# Patient Record
Sex: MALE | Race: WHITE | NOT HISPANIC OR LATINO | Employment: FULL TIME | URBAN - METROPOLITAN AREA
[De-identification: names, ages, dates, MRNs, and addresses within clinical notes are randomized per-mention and may not be internally consistent; named-entity substitution may affect disease eponyms.]

---

## 2023-11-12 PROBLEM — R20.8 DYSESTHESIA: Status: ACTIVE | Noted: 2023-11-12

## 2023-11-12 PROBLEM — J34.89 NASAL OBSTRUCTION: Status: ACTIVE | Noted: 2023-11-12

## 2023-11-12 PROBLEM — K21.9 LARYNGOPHARYNGEAL REFLUX: Status: ACTIVE | Noted: 2023-11-12

## 2023-11-12 PROBLEM — R09.82 POST-NASAL DISCHARGE: Status: ACTIVE | Noted: 2023-11-12

## 2023-11-12 PROBLEM — R43.8 IMPAIRED SENSE OF SMELL: Status: ACTIVE | Noted: 2023-11-12

## 2023-11-12 PROBLEM — H61.22 EXCESSIVE CERUMEN IN EAR CANAL, LEFT: Status: ACTIVE | Noted: 2023-11-12

## 2023-11-12 PROBLEM — I48.0 PAROXYSMAL ATRIAL FIBRILLATION (MULTI): Status: ACTIVE | Noted: 2018-05-18

## 2023-11-12 PROBLEM — I10 ESSENTIAL HYPERTENSION: Status: ACTIVE | Noted: 2018-08-15

## 2023-11-12 PROBLEM — H90.3 BILATERAL SENSORINEURAL HEARING LOSS: Status: ACTIVE | Noted: 2023-11-12

## 2023-11-12 PROBLEM — R09.89 THROAT CLEARING: Status: ACTIVE | Noted: 2023-11-12

## 2023-11-12 PROBLEM — J34.3 HYPERTROPHY OF NASAL TURBINATES: Status: ACTIVE | Noted: 2023-11-12

## 2023-11-12 PROBLEM — Z86.79 HISTORY OF ATRIAL FIBRILLATION: Status: ACTIVE | Noted: 2023-11-12

## 2023-11-12 PROBLEM — R09.81 NASAL CONGESTION: Status: ACTIVE | Noted: 2023-11-12

## 2023-11-12 PROBLEM — H69.93 DYSFUNCTION OF BOTH EUSTACHIAN TUBES: Status: ACTIVE | Noted: 2023-11-12

## 2023-11-12 PROBLEM — Z86.16 HISTORY OF SEVERE ACUTE RESPIRATORY SYNDROME CORONAVIRUS 2 (SARS-COV-2) DISEASE: Status: ACTIVE | Noted: 2023-11-12

## 2023-11-12 PROBLEM — J34.2 DEVIATED NASAL SEPTUM: Status: ACTIVE | Noted: 2023-11-12

## 2023-11-12 PROBLEM — J32.9 CHRONIC RHINOSINUSITIS: Status: ACTIVE | Noted: 2023-11-12

## 2023-11-12 PROBLEM — Z86.79 HISTORY OF HYPERTENSION: Status: ACTIVE | Noted: 2023-11-12

## 2023-11-12 RX ORDER — MELATONIN 1 MG
1 TABLET,CHEWABLE ORAL NIGHTLY
COMMUNITY

## 2023-11-12 RX ORDER — PHENOL 1.4 %
1 AEROSOL, SPRAY (ML) MUCOUS MEMBRANE DAILY
COMMUNITY
Start: 2018-05-09

## 2023-11-12 RX ORDER — MOMETASONE FUROATE 100 %
POWDER (GRAM) MISCELLANEOUS
COMMUNITY
Start: 2023-04-03

## 2023-11-12 RX ORDER — AMOXICILLIN AND CLAVULANATE POTASSIUM 875; 125 MG/1; MG/1
875 TABLET, FILM COATED ORAL 2 TIMES DAILY
COMMUNITY
Start: 2018-05-08 | End: 2023-11-15 | Stop reason: ALTCHOICE

## 2023-11-12 RX ORDER — METOPROLOL SUCCINATE 25 MG/1
25 TABLET, EXTENDED RELEASE ORAL DAILY
COMMUNITY
Start: 2018-04-27

## 2023-11-12 RX ORDER — ASPIRIN 325 MG
325 TABLET ORAL DAILY
COMMUNITY
Start: 2018-05-09

## 2023-11-12 RX ORDER — IPRATROPIUM BROMIDE 42 UG/1
2 SPRAY, METERED NASAL 4 TIMES DAILY
COMMUNITY
Start: 2023-08-07

## 2023-11-12 RX ORDER — AZELASTINE 1 MG/ML
1 SPRAY, METERED NASAL 2 TIMES DAILY
COMMUNITY
Start: 2021-11-01

## 2023-11-12 RX ORDER — CETIRIZINE HYDROCHLORIDE 10 MG/1
10 TABLET ORAL
COMMUNITY
Start: 2018-05-09

## 2023-11-12 RX ORDER — LISINOPRIL 10 MG/1
10 TABLET ORAL DAILY
COMMUNITY
Start: 2018-04-30

## 2023-11-12 RX ORDER — PANTOPRAZOLE SODIUM 40 MG/1
40 TABLET, DELAYED RELEASE ORAL
COMMUNITY
Start: 2021-12-13

## 2023-11-12 RX ORDER — LOSARTAN POTASSIUM 50 MG/1
50 TABLET ORAL DAILY
COMMUNITY
Start: 2021-10-12

## 2023-11-12 RX ORDER — MONTELUKAST SODIUM 10 MG/1
10 TABLET ORAL NIGHTLY
COMMUNITY
Start: 2018-04-19

## 2023-11-12 RX ORDER — CHLORPHENIRAMINE MALEATE 4 MG
4 TABLET ORAL EVERY 6 HOURS PRN
COMMUNITY
Start: 2022-11-15

## 2023-11-12 RX ORDER — SOD CHLOR,BICARB/SQUEEZ BOTTLE
PACKET, WITH RINSE DEVICE NASAL
COMMUNITY
Start: 2023-02-13

## 2023-11-15 ENCOUNTER — OFFICE VISIT (OUTPATIENT)
Dept: OTOLARYNGOLOGY | Facility: CLINIC | Age: 68
End: 2023-11-15
Payer: MEDICARE

## 2023-11-15 VITALS
TEMPERATURE: 97.4 F | DIASTOLIC BLOOD PRESSURE: 80 MMHG | HEIGHT: 71 IN | RESPIRATION RATE: 16 BRPM | WEIGHT: 198 LBS | HEART RATE: 64 BPM | BODY MASS INDEX: 27.72 KG/M2 | SYSTOLIC BLOOD PRESSURE: 129 MMHG

## 2023-11-15 DIAGNOSIS — J33.9 NASAL POLYPS: ICD-10-CM

## 2023-11-15 DIAGNOSIS — K21.9 LARYNGOPHARYNGEAL REFLUX: ICD-10-CM

## 2023-11-15 DIAGNOSIS — R09.81 NASAL CONGESTION: ICD-10-CM

## 2023-11-15 DIAGNOSIS — J34.89 NASAL DRAINAGE: ICD-10-CM

## 2023-11-15 DIAGNOSIS — J34.2 DEVIATED NASAL SEPTUM: ICD-10-CM

## 2023-11-15 DIAGNOSIS — J34.3 HYPERTROPHY OF BOTH INFERIOR NASAL TURBINATES: ICD-10-CM

## 2023-11-15 DIAGNOSIS — J34.89 NASAL OBSTRUCTION: ICD-10-CM

## 2023-11-15 DIAGNOSIS — J32.9 CHRONIC RHINOSINUSITIS: Primary | ICD-10-CM

## 2023-11-15 PROCEDURE — 1159F MED LIST DOCD IN RCRD: CPT | Performed by: STUDENT IN AN ORGANIZED HEALTH CARE EDUCATION/TRAINING PROGRAM

## 2023-11-15 PROCEDURE — 1036F TOBACCO NON-USER: CPT | Performed by: STUDENT IN AN ORGANIZED HEALTH CARE EDUCATION/TRAINING PROGRAM

## 2023-11-15 PROCEDURE — 3074F SYST BP LT 130 MM HG: CPT | Performed by: STUDENT IN AN ORGANIZED HEALTH CARE EDUCATION/TRAINING PROGRAM

## 2023-11-15 PROCEDURE — 3079F DIAST BP 80-89 MM HG: CPT | Performed by: STUDENT IN AN ORGANIZED HEALTH CARE EDUCATION/TRAINING PROGRAM

## 2023-11-15 PROCEDURE — 99214 OFFICE O/P EST MOD 30 MIN: CPT | Performed by: STUDENT IN AN ORGANIZED HEALTH CARE EDUCATION/TRAINING PROGRAM

## 2023-11-15 RX ORDER — OMEPRAZOLE 20 MG/1
20 CAPSULE, DELAYED RELEASE ORAL
COMMUNITY
Start: 2023-01-09

## 2023-11-15 ASSESSMENT — ENCOUNTER SYMPTOMS
DEPRESSION: 0
OCCASIONAL FEELINGS OF UNSTEADINESS: 0
LOSS OF SENSATION IN FEET: 0

## 2023-11-15 NOTE — PROGRESS NOTES
Assessment  Chronic rhinosinusitis with nasal polyps  Facial pressure  Nasal drainage  Nasal airway obstruction  Nasal septal deviation  Bilateral inferior turbinate hypertrophy  History of sinonasal surgery  History of C-diff      Plan  - CRS  The patient and I discussed their current nasal / sinus symptoms as well as several therapeutic options.  Reports his sinonasal symptomatology is well controlled. We will continue his current  nasal hygiene/ medical regimen consisting of saline/mometasone rinses (every other day).   Endorses improvement of his posterior/anterior nasal drainage with ipratropium nasal spray (PRN).        - LPR  No significant improvement of his posterior nasal drainage with pantoprazole trial.  Denies heartburn/reflux symptomatology.  He will stop pantoprazole and resume it as needed.     RTC 6m      History of Present Illness  11/15/23  Sinonasal symptomatology is well controlled.  Nasal drainage improved with ipratropium nasal spray as needed.  8/7/23  The patient present for follow-up, reports his sinonasal symptomatology is currently well controlled.  Does endorse increased nasal drainage triggered by hot beverages and going from cold to hot environments like a hot shower.  Also endorses thick pharyngeal mucus/throat clearing in the mornings for years, has a history of reflux.   4/3/23  The patient presents for follow-up, reports significant improvement of his sinonasal symptomatology including nasal congestion/obstruction and nasal drainage. Facial pressure has resolved.  Current therapy: Saline/mometasone rinses qd; Azelastine PRN.      Recall   67-year-old male presenting for initial evaluation of multiple sinonasal complaints     Daily sinonasal symptoms include bilateral nasal congestion/obstruction, anterior posterior nasal drainage, facial pressure on the maxillary and frontal regions bilaterally.  Duration: Years, worse for the past 6 months  Laterality: Bilateral  In addition the  patient endorses decreased smell and possible environmental allergies.  Has received multiple courses of antibiotics without significant improvement of his symptoms.   Developed C. difficile following prolonged course of azithromycin (~3w ago).  Endorses brief course of steroids (3d) with slight symptomatic improvement.  Patient denies facial pain, immunotherapy.   No odynophagia/dysphagia/SOB/dyspnea/hoarseness/fevers/chills/weight loss/night sweats.     Current treatment:  Nasal Steroid: Flonase   Sinus Rinse: No  Antihistamine: No  Prednisone: No  Other: None     Recent CT: CT maxillofacial 12/19/22 notable for postsurgical changes s/p EES, bilateral maxillary, ethmoid, frontal and sphenoid edema.  Previous nasal/sinus surgery: FESS 1990s.       Past Medical History:   Diagnosis Date    Personal history of other diseases of the circulatory system     History of atrial fibrillation    Personal history of other diseases of the circulatory system     History of hypertension    Personal history of peptic ulcer disease     History of gastric ulcer       Past Surgical History:   Procedure Laterality Date    SINUS SURGERY  05/09/2018    Sinus Surgery         Current Outpatient Medications on File Prior to Visit   Medication Sig Dispense Refill    omeprazole (PriLOSEC) 20 mg DR capsule Take 1 capsule (20 mg) by mouth once daily in the morning. Take before meals.      amoxicillin-pot clavulanate (Augmentin) 875-125 mg tablet Take 1 tablet (875 mg) by mouth 2 times a day.      aspirin 325 mg tablet Take 1 tablet (325 mg) by mouth once daily.      azelastine (Astelin) 137 mcg (0.1 %) nasal spray Administer 1 spray into affected nostril(s) twice a day.      cetirizine (ZyrTEC) 10 mg tablet Take 1 tablet (10 mg) by mouth once daily.      chlorpheniramine (Chlor-Trimeton) 4 mg tablet Take 1 tablet (4 mg) by mouth every 6 hours if needed for allergies.      doxylamine (Unisom) 25 mg tablet Take 1 tablet (25 mg) by mouth once  daily at bedtime.      ipratropium (Atrovent) 42 mcg (0.06 %) nasal spray Administer 2 sprays into affected nostril(s) 4 times a day.      lisinopril 10 mg tablet Take 1 tablet (10 mg) by mouth once daily.      losartan (Cozaar) 50 mg tablet Take 1 tablet (50 mg) by mouth once daily.      melatonin 1 mg tablet,chewable Chew 1 tablet once daily at bedtime.      metoprolol succinate XL (Toprol-XL) 25 mg 24 hr tablet Take 1 tablet (25 mg) by mouth once daily.      mometasone furoate, bulk, 100 % powder Mometasone Furoate Powder Quantity: 0 Refills: 0 Ordered: 3-Apr-2023 DO Start : 3-Apr-2023 Active      montelukast (Singulair) 10 mg tablet Take 1 tablet (10 mg) by mouth once daily at bedtime.      multivitamin with minerals iron-free (Adults 50 Plus) Take 1 tablet by mouth once daily.      pantoprazole (ProtoNix) 40 mg EC tablet Take 1 tablet (40 mg) by mouth once daily in the morning. Take before meals.      sod chloride-sodium bicarb (Sinus Rinse Starter) nasal rinse Administer into affected nostril(s).       No current facility-administered medications on file prior to visit.        Review of Systems  A detailed 12 point ROS was performed and is negative except as noted in the intake form, HPI and/or Past Medical History     There were no vitals filed for this visit.     Physical Exam  CONSTITUTIONAL: Vitals -reviewed from intake field, well developed, well nourished.  VOICE: Normal voice quality  RESPIRATION: Breathing comfortably, no stridor.  CV: No clubbing/cyanosis/edema in hands.  EYES: EOM Intact, sclera normal.  NEURO: Alert and oriented times 3, Cranial nerves V,VII intact and symmetric bilaterally.  HEAD AND FACE: Symmetric facial features, no masses or lesions, sinuses nontender to palpation.  SALIVARY GLANDS: Parotid and submandibular glands normal bilaterally.  EARS: Normal external ears, external auditory canals, and TMs to otoscopy  + NOSE: External nose midline, anterior rhinoscopy is normal with  limited visualization to the anterior aspect of the interior turbinates. No lesions noted.  Bilateral inferior turbinate hypertrophy  Right posterior septal deviation  ORAL CAVITY/OROPHARYNX/LIPS: Normal mucous membranes, normal floor of mouth/tongue/OP, no masses or lesions are noted.  PHARYNGEAL WALLS AND NASOPHARYNX: No masses noted. Mucosa appears clean and moist  NECK/LYMPH: No LAD, no thyroid masses. Trachea palpably midline  SKIN: Neck skin is without scar or injury  PSYCH: Alert and oriented with appropriate mood and affect

## 2024-01-30 RX ORDER — MOMETASONE FUROATE 100 %
POWDER (GRAM) MISCELLANEOUS
OUTPATIENT
Start: 2024-01-30

## 2024-01-30 NOTE — TELEPHONE ENCOUNTER
Requested Prescriptions     Pending Prescriptions Disp Refills    mometasone furoate, bulk, 100 % powder       Sig: Mometasone Furoate Powder Quantity: 0 Refills: 0 Ordered: 3-Apr-2023 DO Start : 3-Apr-2023 Active     Юлия Plummer MA     General

## 2024-05-15 ENCOUNTER — OFFICE VISIT (OUTPATIENT)
Dept: OTOLARYNGOLOGY | Facility: CLINIC | Age: 69
End: 2024-05-15
Payer: MEDICARE

## 2024-05-15 VITALS — BODY MASS INDEX: 27.58 KG/M2 | WEIGHT: 197 LBS | HEIGHT: 71 IN

## 2024-05-15 DIAGNOSIS — J34.2 DEVIATED NASAL SEPTUM: ICD-10-CM

## 2024-05-15 DIAGNOSIS — J32.9 CHRONIC RHINOSINUSITIS: Primary | ICD-10-CM

## 2024-05-15 DIAGNOSIS — J33.9 NASAL POLYPS: ICD-10-CM

## 2024-05-15 DIAGNOSIS — H61.21 IMPACTED CERUMEN OF RIGHT EAR: ICD-10-CM

## 2024-05-15 DIAGNOSIS — J34.89 NASAL OBSTRUCTION: ICD-10-CM

## 2024-05-15 DIAGNOSIS — R09.81 NASAL CONGESTION: ICD-10-CM

## 2024-05-15 DIAGNOSIS — J34.3 HYPERTROPHY OF BOTH INFERIOR NASAL TURBINATES: ICD-10-CM

## 2024-05-15 DIAGNOSIS — J34.89 NASAL DRAINAGE: ICD-10-CM

## 2024-05-15 PROCEDURE — 1159F MED LIST DOCD IN RCRD: CPT | Performed by: STUDENT IN AN ORGANIZED HEALTH CARE EDUCATION/TRAINING PROGRAM

## 2024-05-15 PROCEDURE — 69210 REMOVE IMPACTED EAR WAX UNI: CPT | Performed by: STUDENT IN AN ORGANIZED HEALTH CARE EDUCATION/TRAINING PROGRAM

## 2024-05-15 PROCEDURE — 99213 OFFICE O/P EST LOW 20 MIN: CPT | Performed by: STUDENT IN AN ORGANIZED HEALTH CARE EDUCATION/TRAINING PROGRAM

## 2024-05-15 PROCEDURE — 31231 NASAL ENDOSCOPY DX: CPT | Performed by: STUDENT IN AN ORGANIZED HEALTH CARE EDUCATION/TRAINING PROGRAM

## 2024-05-15 PROCEDURE — 1036F TOBACCO NON-USER: CPT | Performed by: STUDENT IN AN ORGANIZED HEALTH CARE EDUCATION/TRAINING PROGRAM

## 2024-05-15 NOTE — PROGRESS NOTES
Assessment  Chronic rhinosinusitis with nasal polyps  Facial pressure  Nasal drainage  Nasal airway obstruction  Nasal septal deviation  Bilateral inferior turbinate hypertrophy  History of sinonasal surgery  History of C-diff      Plan  - CRS  The patient and I discussed their current nasal / sinus symptoms as well as several therapeutic options.  Sinonasal symptomatology is well controlled.   He will continue his current nasal hygiene/ medical regimen consisting of saline/mometasone rinses (every other day).   Endorses improvement of his posterior/anterior nasal drainage with ipratropium nasal spray (PRN).   Right cerumen impaction removed, hearing at baseline following cerumen removal.  RTC 1y      History of Present Illness  5/15/24  The patient presents for follow-up, reports his sinonasal symptomatology is well-controlled, endorses occasional bilateral anterior and posterior nasal drainage otherwise no sinonasal complaints.  11/15/23  Sinonasal symptomatology is well controlled.  Nasal drainage improved with ipratropium nasal spray as needed.  8/7/23  The patient present for follow-up, reports his sinonasal symptomatology is currently well controlled.  Does endorse increased nasal drainage triggered by hot beverages and going from cold to hot environments like a hot shower.  Also endorses thick pharyngeal mucus/throat clearing in the mornings for years, has a history of reflux.   4/3/23  The patient presents for follow-up, reports significant improvement of his sinonasal symptomatology including nasal congestion/obstruction and nasal drainage. Facial pressure has resolved.  Current therapy: Saline/mometasone rinses qd; Azelastine PRN.   Recall   67-year-old male presenting for initial evaluation of multiple sinonasal complaints  Daily sinonasal symptoms include bilateral nasal congestion/obstruction, anterior posterior nasal drainage, facial pressure on the maxillary and frontal regions bilaterally.  Duration:  Years, worse for the past 6 months  Laterality: Bilateral  In addition the patient endorses decreased smell and possible environmental allergies.  Has received multiple courses of antibiotics without significant improvement of his symptoms.   Developed C. difficile following prolonged course of azithromycin (~3w ago).  Endorses brief course of steroids (3d) with slight symptomatic improvement.  Patient denies facial pain, immunotherapy.   No odynophagia/dysphagia/SOB/dyspnea/hoarseness/fevers/chills/weight loss/night sweats.     Current treatment:  Nasal Steroid: Flonase   Sinus Rinse: No  Antihistamine: No  Prednisone: No  Other: None     Recent CT: CT maxillofacial 12/19/22 notable for postsurgical changes s/p EES, bilateral maxillary, ethmoid, frontal and sphenoid edema.  Previous nasal/sinus surgery: FESS 1990s.       Past Medical History:   Diagnosis Date    Personal history of other diseases of the circulatory system     History of atrial fibrillation    Personal history of other diseases of the circulatory system     History of hypertension    Personal history of peptic ulcer disease     History of gastric ulcer       Past Surgical History:   Procedure Laterality Date    SINUS SURGERY  05/09/2018    Sinus Surgery         Current Outpatient Medications on File Prior to Visit   Medication Sig Dispense Refill    aspirin 325 mg tablet Take 1 tablet (325 mg) by mouth once daily.      azelastine (Astelin) 137 mcg (0.1 %) nasal spray Administer 1 spray into affected nostril(s) twice a day.      cetirizine (ZyrTEC) 10 mg tablet Take 1 tablet (10 mg) by mouth once daily.      chlorpheniramine (Chlor-Trimeton) 4 mg tablet Take 1 tablet (4 mg) by mouth every 6 hours if needed for allergies.      doxylamine (Unisom) 25 mg tablet Take 1 tablet (25 mg) by mouth once daily at bedtime.      ipratropium (Atrovent) 42 mcg (0.06 %) nasal spray Administer 2 sprays into affected nostril(s) 4 times a day.      lisinopril 10 mg  tablet Take 1 tablet (10 mg) by mouth once daily.      losartan (Cozaar) 50 mg tablet Take 1 tablet (50 mg) by mouth once daily.      melatonin 1 mg tablet,chewable Chew 1 tablet once daily at bedtime.      metoprolol succinate XL (Toprol-XL) 25 mg 24 hr tablet Take 1 tablet (25 mg) by mouth once daily.      mometasone furoate, bulk, 100 % powder Mometasone Furoate Powder Quantity: 0 Refills: 0 Ordered: 3-Apr-2023 DO Start : 3-Apr-2023 Active      montelukast (Singulair) 10 mg tablet Take 1 tablet (10 mg) by mouth once daily at bedtime.      multivitamin with minerals iron-free (Adults 50 Plus) Take 1 tablet by mouth once daily.      sod chloride-sodium bicarb (Sinus Rinse Starter) nasal rinse Administer into affected nostril(s).      omeprazole (PriLOSEC) 20 mg DR capsule Take 1 capsule (20 mg) by mouth once daily in the morning. Take before meals.      pantoprazole (ProtoNix) 40 mg EC tablet Take 1 tablet (40 mg) by mouth once daily in the morning. Take before meals.       No current facility-administered medications on file prior to visit.        Review of Systems  A detailed 12 point ROS was performed and is negative except as noted in the intake form, HPI and/or Past Medical History     There were no vitals filed for this visit.     Physical Exam  CONSTITUTIONAL: Well developed, well nourished.  VOICE: Normal voice quality  RESPIRATION: Breathing comfortably, no stridor.  CV: No clubbing/cyanosis/edema in hands.  EYES: EOM Intact, sclera normal.  NEURO: Alert and oriented times 3, Cranial nerves V,VII intact and symmetric bilaterally.  HEAD AND FACE: Symmetric facial features, no masses or lesions, sinuses nontender to palpation.  SALIVARY GLANDS: Parotid and submandibular glands normal bilaterally.  + EARS: Normal external ears  Right EAC with cerumen obstruction (removed)  Right EAC patent, tympanic membrane intact  Left EAC patent, tympanic membrane intact   + NOSE: External nose midline, anterior rhinoscopy  is normal with limited visualization to the anterior aspect of the interior turbinates. No lesions noted.  Bilateral inferior turbinate hypertrophy  Right posterior septal deviation  ORAL CAVITY/OROPHARYNX/LIPS: Normal mucous membranes, normal floor of mouth/tongue/OP, no masses or lesions are noted.  PHARYNGEAL WALLS AND NASOPHARYNX: No masses noted. Mucosa appears clean and moist  NECK/LYMPH: No LAD, no thyroid masses. Trachea palpably midline  SKIN: Neck skin is without scar or injury  PSYCH: Alert and oriented with appropriate mood and affect     Nasal endoscopy:  PROCEDURE NOTE     For better visualization because of septal deviation, turbinate hypertrophy nasal endoscopy was performed after verbal consent was obtained by the patient and/or guardian. Both nostrils were sprayed with a mixture of lidocaine 4% and Afrin. After a sufficient amount of time elapsed for mucosal anesthesia to take place, the nasal endoscope was advanced into the nostril.     The following areas were visualized:  Nasal passage, nasal septum, turbinates, middle meatus, nasopharynx, sinus ostia     The patient tolerated the procedure well and these structures were found to be normal except as follows:  Bilateral inferior turbinate hypertrophy  Right septal deviation  Right: Minimal generalized mucosal edema.   Mild polyp changes medial/lateral to middle turbinate   Surgical changes/scarring along the left middle meatus and ethmoid region. No mucopurulence noted.  Left: Minimal mucosal edema, polyps noted within the middle meatus significant improved.  Surgical changes/scarring along the middle meatus and ethmoid region. No mucopurulence noted  No masses seen in inferior meati, middle meati, nor sphenoethmoidal recesses bilaterally.     Procedure: Removal of impacted cerumen, right   Indication: Cerumen impaction.   The procedure was reviewed and explained.  Verbal consent was obtained.  With the use of the otoscope the right ear was  examined, cerumen was cleaned with the use of curettes.  Hearing at baseline following cerumen removal.  The patient tolerated the procedure well.

## 2025-02-25 ENCOUNTER — HOSPITAL ENCOUNTER (OUTPATIENT)
Dept: RADIOLOGY | Facility: CLINIC | Age: 70
Discharge: HOME | End: 2025-02-25
Payer: MEDICARE

## 2025-02-25 DIAGNOSIS — Z13.6 ENCOUNTER FOR SCREENING FOR CARDIOVASCULAR DISORDERS: ICD-10-CM

## 2025-02-25 PROCEDURE — 75571 CT HRT W/O DYE W/CA TEST: CPT

## 2025-05-20 ENCOUNTER — APPOINTMENT (OUTPATIENT)
Dept: OTOLARYNGOLOGY | Facility: CLINIC | Age: 70
End: 2025-05-20
Payer: MEDICARE

## 2025-05-20 DIAGNOSIS — J34.89 NASAL DRAINAGE: ICD-10-CM

## 2025-05-20 DIAGNOSIS — J34.89 NASAL OBSTRUCTION: ICD-10-CM

## 2025-05-20 DIAGNOSIS — J32.9 CHRONIC RHINOSINUSITIS: ICD-10-CM

## 2025-05-20 DIAGNOSIS — J34.2 DEVIATED NASAL SEPTUM: ICD-10-CM

## 2025-05-20 DIAGNOSIS — K21.9 LARYNGOPHARYNGEAL REFLUX (LPR): Primary | ICD-10-CM

## 2025-05-20 DIAGNOSIS — R09.89 THROAT CLEARING: ICD-10-CM

## 2025-05-20 DIAGNOSIS — R09.81 NASAL CONGESTION: ICD-10-CM

## 2025-05-20 DIAGNOSIS — J33.9 NASAL POLYPS: ICD-10-CM

## 2025-05-20 DIAGNOSIS — J34.3 HYPERTROPHY OF BOTH INFERIOR NASAL TURBINATES: ICD-10-CM

## 2025-05-20 RX ORDER — PANTOPRAZOLE SODIUM 40 MG/1
40 TABLET, DELAYED RELEASE ORAL
Qty: 90 TABLET | Refills: 1 | Status: SHIPPED | OUTPATIENT
Start: 2025-05-20 | End: 2025-11-16

## 2025-05-20 NOTE — PROGRESS NOTES
Assessment  Chronic rhinosinusitis with nasal polyps  Facial pressure  Nasal drainage  Nasal airway obstruction  Nasal septal deviation  Bilateral inferior turbinate hypertrophy  History of sinonasal surgery  History of C-diff      Plan  - CRS  The patient and I discussed their current nasal / sinus symptoms as well as several therapeutic options.  Sinonasal symptomatology is controlled.   He will continue his current nasal hygiene/ medical regimen consisting of saline/mometasone rinses (every other day).   Stopped ipratropium nasal spray to use due to significant nasal dryness.    - LPR   Reports frequent throat clearing more noticeable in the mornings.  FNP notable for LPR changes which could be contributing to his symptomatology, dietary modifications for reflux control in addition to OTC Gaviscon use discussed.  Pantoprazole trial prescribed    RTC 2m        History of Present Illness  5/20/25  Endorses thick pharyngeal mucus, throat clearing more noticeable in the mornings.  Throat clearing has been present for years.  Denies sinonasal complaints.  Denies odynophagia, dysphagia, fevers, chills, night sweats, neck pain, neck lumps or bumps, weight loss, voice changes.  Stopped ipratropium nasal spray due to nasal dryness.  Reports  11/15/23  Sinonasal symptomatology is well controlled.  Nasal drainage improved with ipratropium nasal spray as needed.  8/7/23  The patient present for follow-up, reports his sinonasal symptomatology is currently well controlled.  Does endorse increased nasal drainage triggered by hot beverages and going from cold to hot environments like a hot shower.  Also endorses thick pharyngeal mucus/throat clearing in the mornings for years, has a history of reflux.   4/3/23  The patient presents for follow-up, reports significant improvement of his sinonasal symptomatology including nasal congestion/obstruction and nasal drainage. Facial pressure has resolved.  Current therapy:  Saline/mometasone rinses qd; Azelastine PRN.   Recall   67-year-old male presenting for initial evaluation of multiple sinonasal complaints  Daily sinonasal symptoms include bilateral nasal congestion/obstruction, anterior posterior nasal drainage, facial pressure on the maxillary and frontal regions bilaterally.  Duration: Years, worse for the past 6 months  Laterality: Bilateral  In addition the patient endorses decreased smell and possible environmental allergies.  Has received multiple courses of antibiotics without significant improvement of his symptoms.   Developed C. difficile following prolonged course of azithromycin (~3w ago).  Endorses brief course of steroids (3d) with slight symptomatic improvement.  Patient denies facial pain, immunotherapy.   No odynophagia/dysphagia/SOB/dyspnea/hoarseness/fevers/chills/weight loss/night sweats.     Current treatment:  Nasal Steroid: Flonase   Sinus Rinse: No  Antihistamine: No  Prednisone: No  Other: None     Recent CT: CT maxillofacial 12/19/22 notable for postsurgical changes s/p EES, bilateral maxillary, ethmoid, frontal and sphenoid edema.  Previous nasal/sinus surgery: FESS 1990s.       Past Medical History:   Diagnosis Date    Personal history of other diseases of the circulatory system     History of atrial fibrillation    Personal history of other diseases of the circulatory system     History of hypertension    Personal history of peptic ulcer disease     History of gastric ulcer       Past Surgical History:   Procedure Laterality Date    SINUS SURGERY  05/09/2018    Sinus Surgery         Current Outpatient Medications on File Prior to Visit   Medication Sig Dispense Refill    aspirin 325 mg tablet Take 1 tablet (325 mg) by mouth once daily.      azelastine (Astelin) 137 mcg (0.1 %) nasal spray Administer 1 spray into affected nostril(s) twice a day.      cetirizine (ZyrTEC) 10 mg tablet Take 1 tablet (10 mg) by mouth once daily.      chlorpheniramine  (Chlor-Trimeton) 4 mg tablet Take 1 tablet (4 mg) by mouth every 6 hours if needed for allergies.      doxylamine (Unisom) 25 mg tablet Take 1 tablet (25 mg) by mouth once daily at bedtime.      ipratropium (Atrovent) 42 mcg (0.06 %) nasal spray Administer 2 sprays into affected nostril(s) 4 times a day.      lisinopril 10 mg tablet Take 1 tablet (10 mg) by mouth once daily.      losartan (Cozaar) 50 mg tablet Take 1 tablet (50 mg) by mouth once daily.      melatonin 1 mg tablet,chewable Chew 1 tablet once daily at bedtime.      metoprolol succinate XL (Toprol-XL) 25 mg 24 hr tablet Take 1 tablet (25 mg) by mouth once daily.      mometasone furoate, bulk, 100 % powder Mometasone Furoate Powder Quantity: 0 Refills: 0 Ordered: 3-Apr-2023 DO Start : 3-Apr-2023 Active      montelukast (Singulair) 10 mg tablet Take 1 tablet (10 mg) by mouth once daily at bedtime.      multivitamin with minerals iron-free (Adults 50 Plus) Take 1 tablet by mouth once daily.      omeprazole (PriLOSEC) 20 mg DR capsule Take 1 capsule (20 mg) by mouth once daily in the morning. Take before meals.      pantoprazole (ProtoNix) 40 mg EC tablet Take 1 tablet (40 mg) by mouth once daily in the morning. Take before meals.      sod chloride-sodium bicarb (Sinus Rinse Starter) nasal rinse Administer into affected nostril(s).       No current facility-administered medications on file prior to visit.        Review of Systems  A detailed 12 point ROS was performed and is negative except as noted in the intake form, HPI and/or Past Medical History     There were no vitals filed for this visit.     Physical Exam  CONSTITUTIONAL: Well developed, well nourished.  VOICE: Normal voice quality  RESPIRATION: Breathing comfortably, no stridor.  CV: No clubbing/cyanosis/edema in hands.  EYES: EOM Intact, sclera normal.  NEURO: Alert and oriented times 3, Cranial nerves V,VII intact and symmetric bilaterally.  HEAD AND FACE: Symmetric facial features, no masses or  lesions, sinuses nontender to palpation.  SALIVARY GLANDS: Parotid and submandibular glands normal bilaterally.  + EARS: Normal external ears  Right EAC with cerumen obstruction (removed)  Right EAC patent, tympanic membrane intact  Left EAC patent, tympanic membrane intact   + NOSE: External nose midline, anterior rhinoscopy is normal with limited visualization to the anterior aspect of the interior turbinates. No lesions noted.  Bilateral inferior turbinate hypertrophy  Right posterior septal deviation  ORAL CAVITY/OROPHARYNX/LIPS: Normal mucous membranes, normal floor of mouth/tongue/OP, no masses or lesions are noted.  PHARYNGEAL WALLS AND NASOPHARYNX: No masses noted. Mucosa appears clean and moist  NECK/LYMPH: No LAD, no thyroid masses. Trachea palpably midline  SKIN: Neck skin is without scar or injury  PSYCH: Alert and oriented with appropriate mood and affect     Nasal endoscopy:  PROCEDURE NOTE     For better visualization because of septal deviation, turbinate hypertrophy nasal endoscopy was performed after verbal consent was obtained by the patient and/or guardian. Both nostrils were sprayed with a mixture of lidocaine 4% and Afrin. After a sufficient amount of time elapsed for mucosal anesthesia to take place, the nasal endoscope was advanced into the nostril.     The following areas were visualized:  Nasal passage, nasal septum, turbinates, middle meatus, nasopharynx, sinus ostia     The patient tolerated the procedure well and these structures were found to be normal except as follows:  Bilateral inferior turbinate hypertrophy  Right septal deviation  Right: Minimal generalized mucosal edema.   Surgical changes/scarring along the left middle meatus and ethmoid region. No polyps, mucopurulence, masses noted.  Left: Minimal mucosal edema.  Surgical changes/scarring along the middle meatus and ethmoid region. No mucopurulence noted  No polyps, masses seen in inferior meati, middle meati, nor  sphenoethmoidal recesses bilaterally.     PROCEDURE NOTE:  FLEXIBLE NASOLARYNGOSCOPY     The risks, benefits, and alternatives were explained.  The patient wished to proceed and provided verbal consent.       INDICATIONS: To visualize anatomy in specific detail; evaluation of symptomatic disorder involving the voice, swallowing, or upper aerodigestive tract, including RIC.      DESCRIPTION OF PROCEDURE: After adequate afrin and lidocaine spray, I advanced the endoscope into the nasal cavity.  The nasal cavity, nasopharynx, tongue base, vallecula, posterior/lateral pharyngeal walls, pyriform, epiglottis, post cricoid area, and larynx were within normal limits.  The following specific findings should be noted:  No lesions/masses  Mobile TVCs bilaterally, complete glottic closure  Postcricoid edema  No pooling of secretions     The patient tolerated the procedure without difficulty.

## 2025-05-20 NOTE — PATIENT INSTRUCTIONS
"REFLUX (GERD / LPR) INFORMATION SHEET    TYPES OF REFLUX:  *  Gastroesophageal Reflux (GERD):  Backflow of stomach contents (acid) into the esophagus     ·  Symptoms:  Heartburn, regurgitation, swallowing problems  *  Laryngopharyngeal (LPR):  Backflow of stomach contents into the voice box and throat     ·  Symptoms:  Hoarseness, nonproductive throat clearing, cough, swallowing problems, sensation of \"lump\" in the throat, sudden shortness of breath or choking sensation         (especially at night)      WHY DON'T I HAVE HEARTBURN?  Many patients with LPRD do not experience significant heartburn (65%).  Heartburn occurs when the tissue in the esophagus becomes irritated.  The lining in the larynx (voice box) and the upper throat does not have as strong a protective lining as the esophagus, therefore, it is more sensitive to stomach acid.     WHAT CAN I DO TO REDUCE REFLUX?  *   Reduce Stress:  Even a moderate amount of stress can dramatically increase the amount of reflux.    *   Diet:  Try to maintain a healthy body weight.  Eat sensible, moderate amounts.  Eat meals at least 3 hours before bedtime. Avoid bedtime snacks.  Certain foods have been         shown to cause reflux including:     ·  Spicy, acidic and greasy foods     ·  Tomato based foods (spaghetti sauce, Mexican foods, etc.)     ·  Acidic fruit and juices (orange, tomato, cranberry juice, grapefruit, etc.)     ·  Caffeine (tea, coffee, soda, chocolate)     ·  Alcohol     ·  Dairy products     ·  Peppermint    *   Bedtime:  Elevate the head of your bed with 4-6 inch blocks.  Do not elevate your head with extra pillows as this can worsen reflux.  If the entire head of the bed is tilted        upwards, gravity reduces the backflow of acid.   *   Clothing:  Avoid tight belts and other restrictive clothing  *   Smoking:  Avoid smoking and second-hand smoke as this dramatically increases reflux   *   Medications:  NSAIDS (ex. Advil/Motrin type meds), aspirin, " "steroids can aggravate reflux  *   Medical Therapy:     ·  H2 receptor antagonists - cimetidine, ranitidine, famotidine, etc.            o Absorption is reduced 10 to 20 percent by associated antacid administration (ex. Tums)            o Achieve less acid suppression than proton pump inhibitors     ·  Proton pump inhibitor - omeprazole, lansoprazole, pantoprazole, etc.            o PPI's most effective when taken 30 to 60 minutes before meals on an empty stomach            o PPI's can effect absorption of medications (Plavix, Coumadin, etc) so check with your pharmacist prior to initiating therapy for any interactions      ·  Alginates - Gaviscon and Gaviscon Advance              o Block the stomach lining by forming a physical barrier    Once on medical therapy for reflux it can take weeks or months for symptom improvement or resolution as underlying damaged tissue heals (think about a burn on your arm…will take a long time to completely heal).  Persistent symptoms despite appropriate, consistent medical therapy should prompt an evaluation by gastroenterology.        \"[facility] is pleased to meet your health care needs.  We strive to deliver the highest quality and most value based care possible.  Thank you for giving us the opportunity to serve you.\"   "

## 2025-08-20 ENCOUNTER — APPOINTMENT (OUTPATIENT)
Dept: OTOLARYNGOLOGY | Facility: CLINIC | Age: 70
End: 2025-08-20
Payer: MEDICARE

## 2025-08-20 VITALS — WEIGHT: 197 LBS | HEIGHT: 71 IN | BODY MASS INDEX: 27.58 KG/M2

## 2025-08-20 DIAGNOSIS — J34.2 DEVIATED NASAL SEPTUM: ICD-10-CM

## 2025-08-20 DIAGNOSIS — J34.89 NASAL OBSTRUCTION: ICD-10-CM

## 2025-08-20 DIAGNOSIS — R09.81 NASAL CONGESTION: ICD-10-CM

## 2025-08-20 DIAGNOSIS — K21.9 LARYNGOPHARYNGEAL REFLUX (LPR): ICD-10-CM

## 2025-08-20 DIAGNOSIS — J32.9 CHRONIC RHINOSINUSITIS: ICD-10-CM

## 2025-08-20 DIAGNOSIS — K21.9 GASTROESOPHAGEAL REFLUX DISEASE, UNSPECIFIED WHETHER ESOPHAGITIS PRESENT: Primary | ICD-10-CM

## 2025-08-20 DIAGNOSIS — J34.89 NASAL DRAINAGE: ICD-10-CM

## 2025-08-20 DIAGNOSIS — J34.3 HYPERTROPHY OF BOTH INFERIOR NASAL TURBINATES: ICD-10-CM

## 2025-08-20 DIAGNOSIS — R09.89 THROAT CLEARING: ICD-10-CM

## 2025-08-20 PROCEDURE — 1036F TOBACCO NON-USER: CPT | Performed by: STUDENT IN AN ORGANIZED HEALTH CARE EDUCATION/TRAINING PROGRAM

## 2025-08-20 PROCEDURE — 31231 NASAL ENDOSCOPY DX: CPT | Performed by: STUDENT IN AN ORGANIZED HEALTH CARE EDUCATION/TRAINING PROGRAM

## 2025-08-20 PROCEDURE — 3008F BODY MASS INDEX DOCD: CPT | Performed by: STUDENT IN AN ORGANIZED HEALTH CARE EDUCATION/TRAINING PROGRAM

## 2025-08-20 PROCEDURE — 1159F MED LIST DOCD IN RCRD: CPT | Performed by: STUDENT IN AN ORGANIZED HEALTH CARE EDUCATION/TRAINING PROGRAM

## 2025-08-20 PROCEDURE — 99214 OFFICE O/P EST MOD 30 MIN: CPT | Performed by: STUDENT IN AN ORGANIZED HEALTH CARE EDUCATION/TRAINING PROGRAM

## 2025-08-20 RX ORDER — IPRATROPIUM BROMIDE 42 UG/1
2 SPRAY, METERED NASAL 3 TIMES DAILY PRN
Qty: 30 ML | Refills: 3 | Status: SHIPPED | OUTPATIENT
Start: 2025-08-20

## 2026-08-24 ENCOUNTER — APPOINTMENT (OUTPATIENT)
Dept: OTOLARYNGOLOGY | Facility: CLINIC | Age: 71
End: 2026-08-24
Payer: MEDICARE